# Patient Record
Sex: FEMALE | URBAN - NONMETROPOLITAN AREA
[De-identification: names, ages, dates, MRNs, and addresses within clinical notes are randomized per-mention and may not be internally consistent; named-entity substitution may affect disease eponyms.]

---

## 2021-01-19 ENCOUNTER — APPOINTMENT (OUTPATIENT)
Dept: URBAN - NONMETROPOLITAN AREA CLINIC 50 | Age: 56
Setting detail: DERMATOLOGY
End: 2021-01-19

## 2021-01-19 VITALS — HEIGHT: 65 IN | WEIGHT: 112 LBS

## 2021-01-19 DIAGNOSIS — D18.0 HEMANGIOMA: ICD-10-CM

## 2021-01-19 DIAGNOSIS — L29.8 OTHER PRURITUS: ICD-10-CM

## 2021-01-19 PROBLEM — D18.01 HEMANGIOMA OF SKIN AND SUBCUTANEOUS TISSUE: Status: ACTIVE | Noted: 2021-01-19

## 2021-01-19 PROCEDURE — OTHER PRESCRIPTION: OTHER

## 2021-01-19 PROCEDURE — OTHER RECOMMENDATIONS: OTHER

## 2021-01-19 PROCEDURE — OTHER COUNSELING: OTHER

## 2021-01-19 PROCEDURE — 99203 OFFICE O/P NEW LOW 30 MIN: CPT

## 2021-01-19 RX ORDER — TRIAMCINOLONE ACETONIDE 1 MG/G
CREAM TOPICAL BID
Qty: 1 | Refills: 1 | Status: ERX | COMMUNITY
Start: 2021-01-19

## 2021-01-19 ASSESSMENT — LOCATION SIMPLE DESCRIPTION DERM
LOCATION SIMPLE: RIGHT UPPER BACK
LOCATION SIMPLE: CHEST
LOCATION SIMPLE: RIGHT FOREARM
LOCATION SIMPLE: LEFT FOREARM

## 2021-01-19 ASSESSMENT — LOCATION DETAILED DESCRIPTION DERM
LOCATION DETAILED: RIGHT SUPERIOR MEDIAL UPPER BACK
LOCATION DETAILED: LEFT MEDIAL SUPERIOR CHEST
LOCATION DETAILED: UPPER STERNUM
LOCATION DETAILED: RIGHT VENTRAL DISTAL FOREARM
LOCATION DETAILED: LEFT VENTRAL DISTAL FOREARM

## 2021-01-19 ASSESSMENT — LOCATION ZONE DERM
LOCATION ZONE: TRUNK
LOCATION ZONE: ARM

## 2021-01-19 NOTE — PROCEDURE: RECOMMENDATIONS
Recommendation Preamble: The following recommendations were made during the visit:
Render Risk Assessment In Note?: no
Recommendations (Free Text): Otc Sarna lotion
Detail Level: Zone

## 2021-02-02 ENCOUNTER — APPOINTMENT (OUTPATIENT)
Dept: URBAN - NONMETROPOLITAN AREA CLINIC 50 | Age: 56
Setting detail: DERMATOLOGY
End: 2021-02-02

## 2021-02-02 VITALS — WEIGHT: 111 LBS | HEIGHT: 66 IN

## 2021-02-02 DIAGNOSIS — L29.8 OTHER PRURITUS: ICD-10-CM

## 2021-02-02 PROCEDURE — OTHER COUNSELING: OTHER

## 2021-02-02 PROCEDURE — OTHER RECOMMENDATIONS: OTHER

## 2021-02-02 PROCEDURE — 99212 OFFICE O/P EST SF 10 MIN: CPT

## 2021-02-02 ASSESSMENT — LOCATION DETAILED DESCRIPTION DERM: LOCATION DETAILED: MIDDLE STERNUM

## 2021-02-02 ASSESSMENT — LOCATION ZONE DERM: LOCATION ZONE: TRUNK

## 2021-02-02 ASSESSMENT — LOCATION SIMPLE DESCRIPTION DERM: LOCATION SIMPLE: CHEST

## 2021-02-02 NOTE — PROCEDURE: RECOMMENDATIONS
Render Risk Assessment In Note?: no
Recommendations (Free Text): Continue to use prescribed triamcinolone cream medication as directed bid  , Patient was advised to use ice on affected area , OTC Sarna Sensitive  .
Detail Level: Zone
Recommendation Preamble: The following recommendations were made during the visit: